# Patient Record
(demographics unavailable — no encounter records)

---

## 2024-12-30 NOTE — HEALTH RISK ASSESSMENT
[Yes] : Yes [2 - 4 times a month (2 pts)] : 2-4 times a month (2 points) [1 or 2 (0 pts)] : 1 or 2 (0 points) [No falls in past year] : Patient reported no falls in the past year [0] : 2) Feeling down, depressed, or hopeless: Not at all (0) [PHQ-2 Negative - No further assessment needed] : PHQ-2 Negative - No further assessment needed [Never] : Never [Time Spent: ___ Minutes] : I spent [unfilled] minutes performing a depression screening for this patient. [Audit-CScore] : 2 [RDP4Tuseh] : 0

## 2024-12-30 NOTE — HEALTH RISK ASSESSMENT
[Yes] : Yes [2 - 4 times a month (2 pts)] : 2-4 times a month (2 points) [1 or 2 (0 pts)] : 1 or 2 (0 points) [No falls in past year] : Patient reported no falls in the past year [0] : 2) Feeling down, depressed, or hopeless: Not at all (0) [PHQ-2 Negative - No further assessment needed] : PHQ-2 Negative - No further assessment needed [Never] : Never [Time Spent: ___ Minutes] : I spent [unfilled] minutes performing a depression screening for this patient. [Audit-CScore] : 2 [CBL4Nnjop] : 0

## 2024-12-30 NOTE — HISTORY OF PRESENT ILLNESS
[de-identified] : 68-yo man,, with history of  DM2/HTN/HLD/obesity with BMI 35/primary hyperparathyroidism, depression on MAOI in the past and now off all psychiatric medication),morbid obesity BMI >50 with weight  now down to BMI 38, presenting for CPE.  2024 concerns: History of depression but off all psychiatric medicines and sees only his Feels happy about his work that keeps him involved, no children of his own but has many nieces and nephews Plans to travel to various places in the [place] and internationally in 2025 [facility] in February 2025 for root canal and dental work Seeing therapist TIW, not taking psychiatric meds any longer.  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 Hx of depression, was on MAOI for many years but stopped it early 2023, feels well, sees only his therapist and not his psychiatrist. Notes urination has been slow and weak in  2021, stable since then. May go more often and sooner with second void.  Nocturia-none Hx of primary hyperparathyroidism, on Vit D 2000-planned parathyroid surgery 2021 with DR Hoyos, but had bells palsy, surgery held. Will need repeat DEXA this 2023, as last one in 2021 FH CAD in dad with bypass x 4. Established with cardiology DR IDALMIS Buitrago 2022 to assess risk for CAD. Positive coronary calcium score.  DMs-on Metformin  1000 bid but taking only 500mg bid as he has diarrhea on higher dose 1gm bid. Willing to try ER form and if still up, with consider Ozempic or whatever is covered. HLD-Statin changed to Rosuvastatin Feb 2023 with repeat lipids lower LDL cholesterol. May 2023 HTN-controlled on ARB Hx obesity-s/p lap band around 2002 with [facility] Last seen by weight management  in October 2020  but nothing new and stopped going as he  knows what to do. BMI was >50 before lap band and will make appt to f/u nghia Palmer? History of low back pain in April 2021 has been seen by various specialties including urology and orthopedic and thought to be more musculoskeletal related, weight reduction needed and was referred to physical therapy. DEXA 2021-normal T score

## 2024-12-30 NOTE — REVIEW OF SYSTEMS
[Recent Change In Weight] : ~T recent weight change [Negative] : Heme/Lymph [Suicidal] : not suicidal [Insomnia] : no insomnia [de-identified] : see hpi

## 2024-12-30 NOTE — REVIEW OF SYSTEMS
[Recent Change In Weight] : ~T recent weight change [Negative] : Heme/Lymph [Suicidal] : not suicidal [Insomnia] : no insomnia [de-identified] : see hpi

## 2024-12-30 NOTE — PHYSICAL EXAM
[No Acute Distress] : no acute distress [Well Nourished] : well nourished [Well Developed] : well developed [Well-Appearing] : well-appearing [Normal Sclera/Conjunctiva] : normal sclera/conjunctiva [PERRL] : pupils equal round and reactive to light [EOMI] : extraocular movements intact [Normal Outer Ear/Nose] : the outer ears and nose were normal in appearance [Normal Oropharynx] : the oropharynx was normal [No JVD] : no jugular venous distention [No Lymphadenopathy] : no lymphadenopathy [Supple] : supple [Thyroid Normal, No Nodules] : the thyroid was normal and there were no nodules present [No Respiratory Distress] : no respiratory distress  [No Accessory Muscle Use] : no accessory muscle use [Clear to Auscultation] : lungs were clear to auscultation bilaterally [Normal Rate] : normal rate  [Regular Rhythm] : with a regular rhythm [Normal S1, S2] : normal S1 and S2 [No Murmur] : no murmur heard [No Carotid Bruits] : no carotid bruits [No Abdominal Bruit] : a ~M bruit was not heard ~T in the abdomen [No Varicosities] : no varicosities [Pedal Pulses Present] : the pedal pulses are present [No Edema] : there was no peripheral edema [No Palpable Aorta] : no palpable aorta [No Extremity Clubbing/Cyanosis] : no extremity clubbing/cyanosis [Soft] : abdomen soft [Non Tender] : non-tender [Non-distended] : non-distended [No Masses] : no abdominal mass palpated [No HSM] : no HSM [Normal Bowel Sounds] : normal bowel sounds [No Hernias] : no hernias [No CVA Tenderness] : no CVA  tenderness [No Spinal Tenderness] : no spinal tenderness [No Joint Swelling] : no joint swelling [Grossly Normal Strength/Tone] : grossly normal strength/tone [Coordination Grossly Intact] : coordination grossly intact [No Focal Deficits] : no focal deficits [Normal Gait] : normal gait [Deep Tendon Reflexes (DTR)] : deep tendon reflexes were 2+ and symmetric [Normal Affect] : the affect was normal [Normal Insight/Judgement] : insight and judgment were intact [de-identified] : scaling on bottom of both feet

## 2024-12-30 NOTE — HISTORY OF PRESENT ILLNESS
[de-identified] : 68-yo man,, with history of  DM2/HTN/HLD/obesity with BMI 35/primary hyperparathyroidism, depression on MAOI in the past and now off all psychiatric medication),morbid obesity BMI >50 with weight  now down to BMI 38, presenting for CPE.  2024 concerns: History of depression but off all psychiatric medicines and sees only his Feels happy about his work that keeps him involved, no children of his own but has many nieces and nephews Plans to travel to various places in the [place] and internationally in 2025 [facility] in February 2025 for root canal and dental work Seeing therapist TIW, not taking psychiatric meds any longer.  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 Hx of depression, was on MAOI for many years but stopped it early 2023, feels well, sees only his therapist and not his psychiatrist. Notes urination has been slow and weak in  2021, stable since then. May go more often and sooner with second void.  Nocturia-none Hx of primary hyperparathyroidism, on Vit D 2000-planned parathyroid surgery 2021 with DR Hoyos, but had bells palsy, surgery held. Will need repeat DEXA this 2023, as last one in 2021 FH CAD in dad with bypass x 4. Established with cardiology DR IDALMIS Buitrago 2022 to assess risk for CAD. Positive coronary calcium score.  DMs-on Metformin  1000 bid but taking only 500mg bid as he has diarrhea on higher dose 1gm bid. Willing to try ER form and if still up, with consider Ozempic or whatever is covered. HLD-Statin changed to Rosuvastatin Feb 2023 with repeat lipids lower LDL cholesterol. May 2023 HTN-controlled on ARB Hx obesity-s/p lap band around 2002 with [facility] Last seen by weight management  in October 2020  but nothing new and stopped going as he  knows what to do. BMI was >50 before lap band and will make appt to f/u nghia Palmer? History of low back pain in April 2021 has been seen by various specialties including urology and orthopedic and thought to be more musculoskeletal related, weight reduction needed and was referred to physical therapy. DEXA 2021-normal T score

## 2024-12-30 NOTE — PHYSICAL EXAM
[No Acute Distress] : no acute distress [Well Nourished] : well nourished [Well Developed] : well developed [Well-Appearing] : well-appearing [Normal Sclera/Conjunctiva] : normal sclera/conjunctiva [PERRL] : pupils equal round and reactive to light [EOMI] : extraocular movements intact [Normal Outer Ear/Nose] : the outer ears and nose were normal in appearance [Normal Oropharynx] : the oropharynx was normal [No JVD] : no jugular venous distention [No Lymphadenopathy] : no lymphadenopathy [Supple] : supple [Thyroid Normal, No Nodules] : the thyroid was normal and there were no nodules present [No Respiratory Distress] : no respiratory distress  [No Accessory Muscle Use] : no accessory muscle use [Clear to Auscultation] : lungs were clear to auscultation bilaterally [Normal Rate] : normal rate  [Regular Rhythm] : with a regular rhythm [Normal S1, S2] : normal S1 and S2 [No Murmur] : no murmur heard [No Carotid Bruits] : no carotid bruits [No Abdominal Bruit] : a ~M bruit was not heard ~T in the abdomen [No Varicosities] : no varicosities [Pedal Pulses Present] : the pedal pulses are present [No Edema] : there was no peripheral edema [No Palpable Aorta] : no palpable aorta [No Extremity Clubbing/Cyanosis] : no extremity clubbing/cyanosis [Soft] : abdomen soft [Non Tender] : non-tender [Non-distended] : non-distended [No Masses] : no abdominal mass palpated [No HSM] : no HSM [Normal Bowel Sounds] : normal bowel sounds [No Hernias] : no hernias [No CVA Tenderness] : no CVA  tenderness [No Spinal Tenderness] : no spinal tenderness [No Joint Swelling] : no joint swelling [Grossly Normal Strength/Tone] : grossly normal strength/tone [Coordination Grossly Intact] : coordination grossly intact [No Focal Deficits] : no focal deficits [Normal Gait] : normal gait [Deep Tendon Reflexes (DTR)] : deep tendon reflexes were 2+ and symmetric [Normal Affect] : the affect was normal [Normal Insight/Judgement] : insight and judgment were intact [de-identified] : scaling on bottom of both feet

## 2025-04-17 NOTE — HISTORY OF PRESENT ILLNESS
[FreeTextEntry1] : 2/14/2024 68yo gentleman with cc of prostatitis.  pt reports that in mid dec, he developed increased urinary urgency with low voided volumes. Was haivng some dysuria with burning pain at the tip of the penis. He called friends and was placed on abx with cipro for 2 weeks with resolution. He then had recurrence of sx last month. Went to PCP. UCx showed enterococcus. Now on cipro on week 3. No fever. Feeling improved.  04/17/2025 cc bladder stone  Pt is a 69 year old male presenting for. Pt was previously seen by Dr. Patel in 2024. Pt reports having bladder stone visualized right urethral meatus during imaging completed in [facility]. Pt reports while in [place] he began to note pain in his back which he thought was musculoskeletal. Pt reports that soon after he got a got virus with shivers and fever and saw urologist who completed CT scan which visualized stone. Pt reports drinking low quantity of water. Pt reports taking tamsulosin. Pt reports leaving hospital about 1 week ago.   PMHx: kidney stone

## 2025-04-17 NOTE — HISTORY OF PRESENT ILLNESS
[FreeTextEntry1] : 2/14/2024 66yo gentleman with cc of prostatitis.  pt reports that in mid dec, he developed increased urinary urgency with low voided volumes. Was haivng some dysuria with burning pain at the tip of the penis. He called friends and was placed on abx with cipro for 2 weeks with resolution. He then had recurrence of sx last month. Went to PCP. UCx showed enterococcus. Now on cipro on week 3. No fever. Feeling improved.  04/17/2025 cc bladder stone  Pt is a 69 year old male presenting for. Pt was previously seen by Dr. Patel in 2024. Pt reports having bladder stone visualized right urethral meatus during imaging completed in [facility]. Pt reports while in [place] he began to note pain in his back which he thought was musculoskeletal. Pt reports that soon after he got a got virus with shivers and fever and saw urologist who completed CT scan which visualized stone. Pt reports drinking low quantity of water. Pt reports taking tamsulosin. Pt reports leaving hospital about 1 week ago.   PMHx: kidney stone

## 2025-04-17 NOTE — END OF VISIT
[FreeTextEntry4] : This note was written by Chino Pittman on 04/17/2025 actively solely Eliecer Gomez M.D. I, Chino Pittman, am scribing for and in the presence of Eliecer Gomez M.D. in the following sections HISTORY OF PRESENT ILLNESS, PAST MEDICAL/FAMILY/SOCIAL HISTORY; REVIEW OF SYSTEMS; VITAL SIGNS; PHYSICAL EXAM; ASSESSMENT/PLAN. All medical record entries made by this scribe at , Eliecer Gomez M.D. direction and personally dictated by me on 04/17/2025. I personally performed the services described in the documentation, reviewed the documentation recorded by the scribe in my presence, and it accurately and completely records my words and actions.

## 2025-04-17 NOTE — HISTORY OF PRESENT ILLNESS
[Other Location: e.g. School (Enter Location, City,State)___] : at [unfilled], at the time of the visit. [Medical Office: ([facility])___] : at the medical office located in  [Telehealth (audio & video)] : This visit was provided via telehealth using real-time 2-way audio visual technology. [Verbal consent obtained from patient] : the patient, [unfilled] [de-identified] : 69 yoM with hx kidney stone/lithotrypsy over 15 years ago, here for telehealth visit. Returned yesterday from [facility] where he was hospitalized 3/31/2025 with fever, urinary symptoms, and 3 days of back/flank pain. Had dental work in [facility] 3 days prior and admiited for possible endocartitis (ruled out with negative urine and blood cultures., negative TTE, and CT abd/pelvis that revealed 15mm bladder stone. Fever went down after few days on antibiotiocs, and discharged in stable condition to f/[place] when back in [place]. Lost 15 lbs as eaoing less, but otherwise feels better and eating again, urination improved. Referrals given to f/u with Urology (r/o prostatits and bladder stone)

## 2025-04-25 NOTE — ASSESSMENT
[FreeTextEntry1] : Bariatric surgery history: lap band Overweight / obesity comorbidities: t2dm hld htn  Current anti-obesity medications: n Obesity medication side effects: n/a  start mjro 2.5 mg -> 5 mg counseled at length on nutrition, v receptive resources provided lean into diet and WL medication, leading to less pain greater PA in future follow up in 6 - 12 weeks after starting

## 2025-04-25 NOTE — HISTORY OF PRESENT ILLNESS
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

## 2025-05-18 NOTE — END OF VISIT
[Time Spent: ___ minutes] : I have spent [unfilled] minutes of time on the encounter which excludes teaching and separately reported services. 36.5

## 2025-05-18 NOTE — HISTORY OF PRESENT ILLNESS
[No Pertinent Cardiac History] : no history of aortic stenosis, atrial fibrillation, coronary artery disease, recent myocardial infarction, or implantable device/pacemaker [No Pertinent Pulmonary History] : no history of asthma, COPD, sleep apnea, or smoking [No Adverse Anesthesia Reaction] : no adverse anesthesia reaction in self or family member [Diabetes] : diabetes [(Patient denies any chest pain, claudication, dyspnea on exertion, orthopnea, palpitations or syncope)] : Patient denies any chest pain, claudication, dyspnea on exertion, orthopnea, palpitations or syncope [Good (7-10 METs)] : Good (7-10 METs) [Chronic Anticoagulation] : no chronic anticoagulation [Chronic Kidney Disease] : no chronic kidney disease [FreeTextEntry1] : bladder stone removal/cystoscopy [FreeTextEntry2] : 5/19, pst 5/13-Asht Arun [FreeTextEntry3] : Dr Frausto, [facility] [phone], fax [phone] [FreeTextEntry4] : 68 yo male with hx kidney stone and lithotrypsy over 15 years ago, hospitalization 3/31/2025 in [facility] for fever/chills and found to have a bladder stone, advised removal when stable and back in [place] . Scheduled now for bladder stone removal. NO complaints of fever, dysuria/frequency. Hx of obesity, DM2, HTN, HLD, primary hyperparathyroidism, with good control on meds as listed  [FreeTextEntry7] : EKG NSR borderline first degree, no acute St/T wave changes (no change from 1/10/23)

## 2025-05-18 NOTE — HISTORY OF PRESENT ILLNESS
[No Pertinent Cardiac History] : no history of aortic stenosis, atrial fibrillation, coronary artery disease, recent myocardial infarction, or implantable device/pacemaker [No Pertinent Pulmonary History] : no history of asthma, COPD, sleep apnea, or smoking [No Adverse Anesthesia Reaction] : no adverse anesthesia reaction in self or family member [Diabetes] : diabetes [(Patient denies any chest pain, claudication, dyspnea on exertion, orthopnea, palpitations or syncope)] : Patient denies any chest pain, claudication, dyspnea on exertion, orthopnea, palpitations or syncope [Good (7-10 METs)] : Good (7-10 METs) [Chronic Anticoagulation] : no chronic anticoagulation [Chronic Kidney Disease] : no chronic kidney disease [FreeTextEntry1] : bladder stone removal/cystoscopy [FreeTextEntry2] : 5/19, pst 5/13-Asht Arun [FreeTextEntry3] : Dr Frausto, [facility] [phone], fax [phone] [FreeTextEntry4] : 70 yo male with hx kidney stone and lithotrypsy over 15 years ago, hospitalization 3/31/2025 in [facility] for fever/chills and found to have a bladder stone, advised removal when stable and back in [place] . Scheduled now for bladder stone removal. NO complaints of fever, dysuria/frequency. Hx of obesity, DM2, HTN, HLD, primary hyperparathyroidism, with good control on meds as listed  [FreeTextEntry7] : EKG NSR borderline first degree, no acute St/T wave changes (no change from 1/10/23)

## 2025-05-18 NOTE — ASSESSMENT
[FreeTextEntry7] : aware to hold Mounjaro 1 week prio to surgery, Jardiance 3 days prior and evening Metformin day prior to surgery

## 2025-05-18 NOTE — HISTORY OF PRESENT ILLNESS
[No Pertinent Cardiac History] : no history of aortic stenosis, atrial fibrillation, coronary artery disease, recent myocardial infarction, or implantable device/pacemaker [No Pertinent Pulmonary History] : no history of asthma, COPD, sleep apnea, or smoking [No Adverse Anesthesia Reaction] : no adverse anesthesia reaction in self or family member [Diabetes] : diabetes [(Patient denies any chest pain, claudication, dyspnea on exertion, orthopnea, palpitations or syncope)] : Patient denies any chest pain, claudication, dyspnea on exertion, orthopnea, palpitations or syncope [Good (7-10 METs)] : Good (7-10 METs) [Chronic Anticoagulation] : no chronic anticoagulation [Chronic Kidney Disease] : no chronic kidney disease [FreeTextEntry1] : bladder stone removal/cystoscopy [FreeTextEntry2] : 5/19, pst 5/13-Asht Arun [FreeTextEntry3] : Dr Frausto, [facility] [phone], fax [phone] [FreeTextEntry4] : 70 yo male with hx kidney stone and lithotrypsy over 15 years ago, hospitalization 3/31/2025 in [facility] for fever/chills and found to have a bladder stone, advised removal when stable and back in [place] . Scheduled now for bladder stone removal. NO complaints of fever, dysuria/frequency. Hx of obesity, DM2, HTN, HLD, primary hyperparathyroidism, with good control on meds as listed  [FreeTextEntry7] : EKG NSR borderline first degree, no acute St/T wave changes (no change from 1/10/23)

## 2025-05-27 NOTE — PHYSICAL EXAM
[Normal Appearance] : normal appearance [Well Groomed] : well groomed [General Appearance - In No Acute Distress] : no acute distress [Edema] : no peripheral edema [Respiration, Rhythm And Depth] : normal respiratory rhythm and effort [Exaggerated Use Of Accessory Muscles For Inspiration] : no accessory muscle use [Abdomen Soft] : soft [Abdomen Tenderness] : non-tender [Costovertebral Angle Tenderness] : no ~M costovertebral angle tenderness [Urinary Bladder Findings] : the bladder was normal on palpation [Normal Station and Gait] : the gait and station were normal for the patient's age [] : no rash [No Focal Deficits] : no focal deficits [Oriented To Time, Place, And Person] : oriented to person, place, and time [Affect] : the affect was normal [Mood] : the mood was normal [No Palpable Adenopathy] : no palpable adenopathy [de-identified] : villa clear urine

## 2025-05-27 NOTE — END OF VISIT
[FreeTextEntry4] : This note was written by Chino Pittman on 05/20/2025 actively solely Eliecer Gomez M.D. I, Chino Pittman, am scribing for and in the presence of Eliecer Gomez M.D. in the following sections HISTORY OF PRESENT ILLNESS, PAST MEDICAL/FAMILY/SOCIAL HISTORY; REVIEW OF SYSTEMS; VITAL SIGNS; PHYSICAL EXAM; ASSESSMENT/PLAN. All medical record entries made by this scribe at , Eliecer Gomez M.D. direction and personally dictated by me on 05/20/2025. I personally performed the services described in the documentation, reviewed the documentation recorded by the scribe in my presence, and it accurately and completely records my words and actions.

## 2025-05-27 NOTE — HISTORY OF PRESENT ILLNESS
[FreeTextEntry1] :  2/14/2024 66yo gentleman with cc of prostatitis.  pt reports that in mid dec, he developed increased urinary urgency with low voided volumes. Was haivng some dysuria with burning pain at the tip of the penis. He called friends and was placed on abx with cipro for 2 weeks with resolution. He then had recurrence of sx last month. Went to PCP. UCx showed enterococcus. Now on cipro on week 3. No fever. Feeling improved.  04/17/2025 cc bladder stone Pt is a 69 year old male presenting for. Pt was previously seen by Dr. Patel in 2024. Pt reports having bladder stone visualized right urethral meatus during imaging completed in [facility]. Pt reports while in [place] he began to note pain in his back which he thought was musculoskeletal. Pt reports that soon after he got a got virus with shivers and fever and saw urologist who completed CT scan which visualized stone. Pt reports drinking low quantity of water. Pt reports taking tamsulosin. Pt reports leaving hospital about 1 week ago.  PMHx: kidney stone   05/20/2025 cc Pt is a 69 year old male presenting for f/u  s/p cystolithalopaxy  post op cath  dong well

## 2025-05-27 NOTE — PHYSICAL EXAM
[Normal Appearance] : normal appearance [Well Groomed] : well groomed [General Appearance - In No Acute Distress] : no acute distress [Edema] : no peripheral edema [Respiration, Rhythm And Depth] : normal respiratory rhythm and effort [Exaggerated Use Of Accessory Muscles For Inspiration] : no accessory muscle use [Abdomen Soft] : soft [Abdomen Tenderness] : non-tender [Costovertebral Angle Tenderness] : no ~M costovertebral angle tenderness [Urinary Bladder Findings] : the bladder was normal on palpation [Normal Station and Gait] : the gait and station were normal for the patient's age [] : no rash [No Focal Deficits] : no focal deficits [Oriented To Time, Place, And Person] : oriented to person, place, and time [Affect] : the affect was normal [Mood] : the mood was normal [No Palpable Adenopathy] : no palpable adenopathy [de-identified] : villa clear urine

## 2025-05-27 NOTE — ASSESSMENT
[FreeTextEntry1] : Pt is a 69 year old male bladder stones bladder filled via villa and removed  voided well call for difficulty voiding

## 2025-05-27 NOTE — HISTORY OF PRESENT ILLNESS
[FreeTextEntry1] :  2/14/2024 68yo gentleman with cc of prostatitis.  pt reports that in mid dec, he developed increased urinary urgency with low voided volumes. Was haivng some dysuria with burning pain at the tip of the penis. He called friends and was placed on abx with cipro for 2 weeks with resolution. He then had recurrence of sx last month. Went to PCP. UCx showed enterococcus. Now on cipro on week 3. No fever. Feeling improved.  04/17/2025 cc bladder stone Pt is a 69 year old male presenting for. Pt was previously seen by Dr. Patel in 2024. Pt reports having bladder stone visualized right urethral meatus during imaging completed in [facility]. Pt reports while in [place] he began to note pain in his back which he thought was musculoskeletal. Pt reports that soon after he got a got virus with shivers and fever and saw urologist who completed CT scan which visualized stone. Pt reports drinking low quantity of water. Pt reports taking tamsulosin. Pt reports leaving hospital about 1 week ago.  PMHx: kidney stone   05/20/2025 cc Pt is a 69 year old male presenting for f/u  s/p cystolithalopaxy  post op cath  dong well

## 2025-06-03 NOTE — HISTORY OF PRESENT ILLNESS
[FreeTextEntry1] : Patient presents for weight loss and overweight/obese comorbidity management  has had little effect of zepbound dietary changes are difficult but has added some PC HC and continues veg wt stagnant, PA limited struggling with food pleasure cravings opted not for lisdex treatment of adhd BED but having food chatter impulsivity

## 2025-06-03 NOTE — ASSESSMENT
[FreeTextEntry1] : treating BED t2dm hld htn   incr mjro up to 15 mg re-iterated pot value of stimulant, table for now, revisit depending on progress follow up in 3 - 4 months [place]